# Patient Record
Sex: FEMALE | Race: BLACK OR AFRICAN AMERICAN | NOT HISPANIC OR LATINO | Employment: FULL TIME | URBAN - METROPOLITAN AREA
[De-identification: names, ages, dates, MRNs, and addresses within clinical notes are randomized per-mention and may not be internally consistent; named-entity substitution may affect disease eponyms.]

---

## 2020-06-03 ENCOUNTER — OFFICE VISIT (OUTPATIENT)
Dept: FAMILY MEDICINE | Facility: CLINIC | Age: 35
End: 2020-06-03
Payer: MEDICAID

## 2020-06-03 VITALS
HEART RATE: 90 BPM | RESPIRATION RATE: 16 BRPM | BODY MASS INDEX: 43.4 KG/M2 | HEIGHT: 69 IN | DIASTOLIC BLOOD PRESSURE: 100 MMHG | TEMPERATURE: 97 F | OXYGEN SATURATION: 98 % | WEIGHT: 293 LBS | SYSTOLIC BLOOD PRESSURE: 140 MMHG

## 2020-06-03 DIAGNOSIS — F41.9 ANXIETY: Chronic | ICD-10-CM

## 2020-06-03 DIAGNOSIS — Z76.89 ENCOUNTER TO ESTABLISH CARE: ICD-10-CM

## 2020-06-03 DIAGNOSIS — J45.20 MILD INTERMITTENT ASTHMA, UNSPECIFIED WHETHER COMPLICATED: Chronic | ICD-10-CM

## 2020-06-03 DIAGNOSIS — E66.01 MORBID OBESITY WITH BMI OF 45.0-49.9, ADULT: Chronic | ICD-10-CM

## 2020-06-03 DIAGNOSIS — Z79.899 ON LONG TERM DRUG THERAPY: ICD-10-CM

## 2020-06-03 DIAGNOSIS — F17.200 SMOKER: Chronic | ICD-10-CM

## 2020-06-03 DIAGNOSIS — Z00.00 WELLNESS EXAMINATION: Primary | ICD-10-CM

## 2020-06-03 DIAGNOSIS — N92.1 MENORRHAGIA WITH IRREGULAR CYCLE: ICD-10-CM

## 2020-06-03 DIAGNOSIS — L30.9 ECZEMA, UNSPECIFIED TYPE: Chronic | ICD-10-CM

## 2020-06-03 LAB
ABS NRBC COUNT: 0 X 10 3/UL (ref 0–0.01)
ABSOLUTE BASOPHIL: 0.04 X 10 3/UL (ref 0–0.22)
ABSOLUTE EOSINOPHIL: 0.18 X 10 3/UL (ref 0.04–0.54)
ABSOLUTE IMMATURE GRAN: 0.02 X 10 3/UL (ref 0–0.04)
ABSOLUTE LYMPHOCYTE: 2.68 X 10 3/UL (ref 0.86–4.75)
ABSOLUTE MONOCYTE: 0.59 X 10 3/UL (ref 0.22–1.08)
ALBUMIN SERPL-MCNC: 3.9 G/DL (ref 3.5–5.2)
ALBUMIN/GLOB SERPL ELPH: 1 {RATIO} (ref 1–2.7)
ALP ISOS SERPL LEV INH-CCNC: 61 U/L (ref 35–105)
ALT (SGPT): 20 U/L (ref 0–33)
ANION GAP SERPL CALC-SCNC: 12 MMOL/L (ref 8–17)
AST SERPL-CCNC: 16 U/L (ref 0–32)
B12: 391 PG/ML (ref 232–1245)
BASOPHILS NFR BLD: 0.5 % (ref 0.2–1.2)
BILIRUBIN, TOTAL: 0.24 MG/DL (ref 0–1.2)
BUN/CREAT SERPL: 14.5 (ref 6–20)
CALCIUM SERPL-MCNC: 9.2 MG/DL (ref 8.6–10.2)
CARBON DIOXIDE, CO2: 27 MMOL/L (ref 22–29)
CHLORIDE: 103 MMOL/L (ref 98–107)
CHOLEST SERPL-MSCNC: 208 MG/DL (ref 100–200)
CREAT SERPL-MCNC: 0.82 MG/DL (ref 0.5–0.9)
EOSINOPHIL NFR BLD: 2.1 % (ref 0.7–7)
ESTIMATED AVERAGE GLUCOSE: 140 MG/DL
FSH: 6.5 MIU/ML
GFR ESTIMATION: 79.33
GLOBULIN: 3.8 G/DL (ref 1.5–4.5)
GLUCOSE: 83 MG/DL (ref 74–106)
HBA1C MFR BLD: 6.5 % (ref 4–6)
HCT VFR BLD AUTO: 39.5 % (ref 37–47)
HDLC SERPL-MCNC: 45 MG/DL
HGB BLD-MCNC: 12.4 G/DL (ref 12–16)
IMMATURE GRANULOCYTES: 0.2 % (ref 0–0.5)
LDL/HDL RATIO: 3.1 (ref 1–3)
LDLC SERPL CALC-MCNC: 140 MG/DL (ref 0–100)
LH: 6.69 MIU/ML
LYMPHOCYTES NFR BLD: 30.8 % (ref 19.3–53.1)
MCH RBC QN AUTO: 28.1 PG (ref 27–32)
MCHC RBC AUTO-ENTMCNC: 31.4 G/DL (ref 32–36)
MCV RBC AUTO: 89.6 FL (ref 82–100)
MONOCYTES NFR BLD: 6.8 % (ref 4.7–12.5)
NEUTROPHILS ABSOLUTE COUNT: 5.2 X 10 3/UL (ref 2.15–7.56)
NEUTROPHILS NFR BLD: 59.6 %
NUCLEATED RED BLOOD CELLS: 0 /100 WBC (ref 0–0.2)
PLATELET # BLD AUTO: 401 X 10 3/UL (ref 135–400)
POTASSIUM: 3.9 MMOL/L (ref 3.5–5.1)
PROGEST SERPL-MCNC: <0.2 NG/ML
PROT SNV-MCNC: 7.7 G/DL (ref 6.4–8.3)
RBC # BLD AUTO: 4.41 X 10 6/UL (ref 4.2–5.4)
RDW-SD: 45.6 FL (ref 37–54)
SODIUM: 142 MMOL/L (ref 136–145)
TRIGL SERPL-MCNC: 115 MG/DL (ref 0–150)
TSH SERPL DL<=0.005 MIU/L-ACNC: 1.27 UIU/ML (ref 0.27–4.2)
UREA NITROGEN (BUN): 11.9 MG/DL (ref 6–20)
VITAMIN D (25OHD): 15.2 NG/ML
WBC # BLD: 8.71 X 10 3/UL (ref 4.3–10.8)

## 2020-06-03 PROCEDURE — 99385 PREV VISIT NEW AGE 18-39: CPT | Mod: S$GLB,,, | Performed by: FAMILY MEDICINE

## 2020-06-03 PROCEDURE — 99385 PR PREVENTIVE VISIT,NEW,18-39: ICD-10-PCS | Mod: S$GLB,,, | Performed by: FAMILY MEDICINE

## 2020-06-03 RX ORDER — HYDROXYZINE HYDROCHLORIDE 50 MG/1
50 TABLET, FILM COATED ORAL NIGHTLY PRN
Qty: 90 TABLET | Refills: 1 | Status: SHIPPED | OUTPATIENT
Start: 2020-06-03

## 2020-06-03 RX ORDER — IPRATROPIUM BROMIDE AND ALBUTEROL SULFATE 2.5; .5 MG/3ML; MG/3ML
3 SOLUTION RESPIRATORY (INHALATION) EVERY 6 HOURS PRN
Qty: 1 BOX | Refills: 0 | Status: SHIPPED | OUTPATIENT
Start: 2020-06-03 | End: 2021-06-02 | Stop reason: SDUPTHER

## 2020-06-03 RX ORDER — MONTELUKAST SODIUM 10 MG/1
10 TABLET ORAL NIGHTLY
Qty: 30 TABLET | Refills: 0 | Status: SHIPPED | OUTPATIENT
Start: 2020-06-03 | End: 2020-07-03

## 2020-06-03 RX ORDER — ALBUTEROL SULFATE 90 UG/1
2 AEROSOL, METERED RESPIRATORY (INHALATION) EVERY 6 HOURS PRN
Qty: 18 G | Refills: 5 | Status: SHIPPED | OUTPATIENT
Start: 2020-06-03

## 2020-06-03 RX ORDER — ALBUTEROL SULFATE 1.25 MG/3ML
1.25 SOLUTION RESPIRATORY (INHALATION) EVERY 6 HOURS PRN
Qty: 1 BOX | Refills: 0 | Status: SHIPPED | OUTPATIENT
Start: 2020-06-03 | End: 2021-06-02 | Stop reason: SDUPTHER

## 2020-06-03 RX ORDER — BUPROPION HYDROCHLORIDE 150 MG/1
150 TABLET, EXTENDED RELEASE ORAL 2 TIMES DAILY
Qty: 60 TABLET | Refills: 11 | Status: SHIPPED | OUTPATIENT
Start: 2020-06-03

## 2020-06-03 RX ORDER — TRIAMCINOLONE ACETONIDE 5 MG/G
CREAM TOPICAL 2 TIMES DAILY
Qty: 15 G | Refills: 1 | Status: SHIPPED | OUTPATIENT
Start: 2020-06-03

## 2020-06-03 NOTE — PROGRESS NOTES
Subjective:       Patient ID: Chikis Hargrove is a 34 y.o. female.    Chief Complaint: Establish Care (pt is here to get established. pt wants something to quit smoking.); Shortness of Breath (pt states she is having trouble breathing for about 2 weeks); and Eczema (pt states it comes and goes on her chin and back and around her nipple. pt is wanting a cream for it. )    33 yo F here to get established and wellness exam. Not seen by a PCP for a while. No meds.   Pt is a smoker and she'd like to have something so it will make it easier to not smoke.   pt is here to get established. pt wants something to quit smoking.   Obesity: pt would like to lose weight. We discussed several possibilities and we will see what the labs show before we come up with a plan  Pt also was dx'd with asthma and she has SoB now. She went to [place] for a while and she did not have SoB, now she has asthma like symptoms again. Discussed allergy induced asthma.  Eczema: pt has some dry and hyperpigmented skin on her chin which comes and goes. It is correlated with the seasons and is only there during the late spring time. She has gotten triamcinolone in the past and would like a refill.             Review of Systems   Constitutional: Negative for activity change, chills, fatigue, fever and unexpected weight change.   HENT: Negative for ear pain, rhinorrhea and trouble swallowing.    Eyes: Negative for pain.   Respiratory: Negative for cough, chest tightness, shortness of breath and wheezing.    Cardiovascular: Negative for chest pain and palpitations.   Gastrointestinal: Negative for abdominal distention, abdominal pain, constipation, diarrhea, nausea and vomiting.   Endocrine: Negative for cold intolerance and heat intolerance.   Genitourinary: Negative for dysuria, frequency and urgency.   Musculoskeletal: Negative for myalgias.   Skin: Negative for rash.   Neurological: Negative for dizziness, syncope, light-headedness and headaches.    Hematological: Does not bruise/bleed easily.   Psychiatric/Behavioral: Negative for agitation and confusion.       Objective:      Physical Exam   Constitutional: She appears well-developed.   HENT:   Right Ear: External ear normal.   Left Ear: External ear normal.   Mouth/Throat: Oropharynx is clear and moist.   Eyes: Conjunctivae and EOM are normal.   Neck: Normal range of motion.   Cardiovascular: Normal rate, regular rhythm and intact distal pulses.   Pulmonary/Chest: Effort normal and breath sounds normal.   Abdominal: Soft.   Musculoskeletal: Normal range of motion.   Neurological: She is alert.   Skin: Skin is warm. Capillary refill takes less than 2 seconds.   Psychiatric: She has a normal mood and affect.   Nursing note and vitals reviewed.      Assessment:       1. Wellness examination    2. Encounter to establish care    3. Smoker    4. Morbid obesity with BMI of 45.0-49.9, adult    5. Mild intermittent asthma, unspecified whether complicated Adjusting regimen per notes   6. Eczema, unspecified type on chin Active   7. Anxiety    8. On long term drug therapy    9. Menorrhagia with irregular cycle        Plan:       PROBLEM LIST     Chikis was seen today for establish care, shortness of breath and eczema.    Diagnoses and all orders for this visit:    Wellness examination  -     CBC auto differential; Future  -     Comprehensive metabolic panel; Future  -     Lipid Panel; Future  -     Vitamin D; Future  -     Hemoglobin A1C; Future  -     TSH; Future  -     Vitamin B12; Future  -     CBC auto differential  -     Comprehensive metabolic panel  -     Lipid Panel  -     Vitamin D  -     Hemoglobin A1C  -     TSH  -     Vitamin B12    Encounter to establish care    Smoker  Comments:  wans to quit  Orders:  -     buPROPion (WELLBUTRIN SR) 150 MG TBSR 12 hr tablet; Take 1 tablet (150 mg total) by mouth 2 (two) times daily. Take qD x 3 days then BiD    Morbid obesity with BMI of 45.0-49.9, adult  Comments:  pt  wants to lose weight    Mild intermittent asthma, unspecified whether complicated  -     montelukast (SINGULAIR) 10 mg tablet; Take 1 tablet (10 mg total) by mouth every evening.  -     NEBULIZER KIT (SUPPLIES) FOR HOME USE  -     albuterol-ipratropium (DUO-NEB) 2.5 mg-0.5 mg/3 mL nebulizer solution; Take 3 mLs by nebulization every 6 (six) hours as needed for Wheezing. Rescue  -     albuterol (ACCUNEB) 1.25 mg/3 mL Nebu; Take 3 mLs (1.25 mg total) by nebulization every 6 (six) hours as needed. Rescue  -     albuterol (VENTOLIN HFA) 90 mcg/actuation inhaler; Inhale 2 puffs into the lungs every 6 (six) hours as needed for Wheezing or Shortness of Breath. Rescue    Eczema, unspecified type on chin  Comments:  triamcinalone creme  Orders:  -     triamcinolone acetonide 0.5% (KENALOG) 0.5 % Crea; Apply topically 2 (two) times daily.    Anxiety  Comments:  wellbutrin, hydroxizine  Orders:  -     buPROPion (WELLBUTRIN SR) 150 MG TBSR 12 hr tablet; Take 1 tablet (150 mg total) by mouth 2 (two) times daily. Take qD x 3 days then BiD  -     hydrOXYzine (ATARAX) 50 MG tablet; Take 1 tablet (50 mg total) by mouth nightly as needed for Anxiety.    On long term drug therapy  -     CBC auto differential; Future  -     Comprehensive metabolic panel; Future  -     Lipid Panel; Future  -     Vitamin D; Future  -     Hemoglobin A1C; Future  -     TSH; Future  -     Vitamin B12; Future  -     CBC auto differential  -     Comprehensive metabolic panel  -     Lipid Panel  -     Vitamin D  -     Hemoglobin A1C  -     TSH  -     Vitamin B12    Menorrhagia with irregular cycle  -     Luteinizing hormone; Future  -     Follicle stimulating hormone; Future  -     Progesterone; Future  -     Estrogens, total; Future  -     Luteinizing hormone  -     Follicle stimulating hormone  -     Progesterone  -     Estrogens, total

## 2020-06-05 ENCOUNTER — TELEPHONE (OUTPATIENT)
Dept: FAMILY MEDICINE | Facility: CLINIC | Age: 35
End: 2020-06-05

## 2020-06-05 NOTE — TELEPHONE ENCOUNTER
----- Message from Charo Hitchcock MD sent at 6/4/2020  4:41 PM CDT -----  Please call the patient and ask if she is coming back to discuss the labs? Her hormones actually look normal although not everything is back and she is technically diabetic and her cholesterol is high and her vitamin d is low - so there are a few items that I would like to discuss with her if she can make it back in.

## 2020-06-06 LAB — ESTROGEN SERPL-MCNC: 251.1 PG/ML

## 2020-06-21 NOTE — PROGRESS NOTES
Subjective:       Patient ID: Chikis Hargrove is a 34 y.o. female.    Chief Complaint: Follow-up (pt is here for a f/u and to go over lab work results. )    35 yo F here to discuss lab results.   Pt is diabetic w/ A1C = 6.5% - this is a new dx for me and pt.   Pt has HLD w/ ascvd of   - this is a new dx for me and pt.  Pt is obese and we can possibly assume that she has Metabolic Syndrome x with her weight proportions and DM and w/ HDL less than 50 (but normal TGA)  BP is well controlled today and HR has normalized as well. Pt has reduced her smoking significantly. yay     Review of Systems   Constitutional: Negative for activity change, chills, fatigue, fever and unexpected weight change.   HENT: Negative for ear pain, rhinorrhea and trouble swallowing.    Eyes: Negative for pain.   Respiratory: Negative for cough, chest tightness, shortness of breath and wheezing.    Cardiovascular: Negative for chest pain and palpitations.   Gastrointestinal: Negative for abdominal distention, abdominal pain, constipation, diarrhea, nausea and vomiting.   Endocrine: Negative for cold intolerance and heat intolerance.   Genitourinary: Negative for dysuria, frequency and urgency.   Musculoskeletal: Negative for myalgias.   Skin: Negative for rash.   Neurological: Negative for dizziness, syncope, light-headedness and headaches.   Hematological: Does not bruise/bleed easily.   Psychiatric/Behavioral: Negative for agitation and confusion.       Objective:      Physical Exam  Vitals signs and nursing note reviewed.   Constitutional:       Appearance: Normal appearance. She is well-developed.   HENT:      Head: Normocephalic.      Right Ear: External ear normal.      Left Ear: External ear normal.   Eyes:      Conjunctiva/sclera: Conjunctivae normal.   Neck:      Musculoskeletal: Normal range of motion.   Cardiovascular:      Rate and Rhythm: Normal rate and regular rhythm.      Pulses: Normal pulses.   Pulmonary:      Effort:  Pulmonary effort is normal.      Breath sounds: Normal breath sounds.   Abdominal:      Palpations: Abdomen is soft.   Musculoskeletal: Normal range of motion.         General: No deformity.   Skin:     General: Skin is warm.      Capillary Refill: Capillary refill takes less than 2 seconds.   Neurological:      Mental Status: She is alert.         Assessment:       1. Encounter to discuss test results    2. Metabolic syndrome X    3. Smoker    4. Morbid obesity with BMI of 45.0-49.9, adult    5. Mild intermittent asthma without complication    6. Pure hypercholesterolemia    7. Type 2 diabetes mellitus without complication, without long-term current use of insulin        Plan:       PROBLEM LIST     Chikis was seen today for follow-up.    Diagnoses and all orders for this visit:    Encounter to discuss test results  Comments:  HLD, DM, vit d def    Metabolic syndrome X    Smoker  Comments:  improving    Morbid obesity with BMI of 45.0-49.9, adult  Comments:  pt lost some weight already    Mild intermittent asthma without complication    Pure hypercholesterolemia  Comments:  new dx; ascvd = 5% - we will wait and see if it drops by itself    Type 2 diabetes mellitus without complication, without long-term current use of insulin  Comments:  new dx; a1c=6.5%  Orders:  -     metFORMIN (GLUCOPHAGE) 500 MG tablet; Take 1 tablet (500 mg total) by mouth daily with breakfast.

## 2020-06-22 ENCOUNTER — OFFICE VISIT (OUTPATIENT)
Dept: FAMILY MEDICINE | Facility: CLINIC | Age: 35
End: 2020-06-22
Payer: MEDICAID

## 2020-06-22 VITALS
TEMPERATURE: 97 F | WEIGHT: 293 LBS | DIASTOLIC BLOOD PRESSURE: 84 MMHG | HEIGHT: 69 IN | SYSTOLIC BLOOD PRESSURE: 131 MMHG | OXYGEN SATURATION: 97 % | HEART RATE: 76 BPM | RESPIRATION RATE: 16 BRPM | BODY MASS INDEX: 43.4 KG/M2

## 2020-06-22 DIAGNOSIS — E66.01 MORBID OBESITY WITH BMI OF 45.0-49.9, ADULT: Chronic | ICD-10-CM

## 2020-06-22 DIAGNOSIS — E88.810 METABOLIC SYNDROME X: Chronic | ICD-10-CM

## 2020-06-22 DIAGNOSIS — Z71.2 ENCOUNTER TO DISCUSS TEST RESULTS: Primary | ICD-10-CM

## 2020-06-22 DIAGNOSIS — E78.00 PURE HYPERCHOLESTEROLEMIA: Chronic | ICD-10-CM

## 2020-06-22 DIAGNOSIS — E11.9 TYPE 2 DIABETES MELLITUS WITHOUT COMPLICATION, WITHOUT LONG-TERM CURRENT USE OF INSULIN: Chronic | ICD-10-CM

## 2020-06-22 DIAGNOSIS — J45.20 MILD INTERMITTENT ASTHMA WITHOUT COMPLICATION: ICD-10-CM

## 2020-06-22 DIAGNOSIS — F17.200 SMOKER: Chronic | ICD-10-CM

## 2020-06-22 PROCEDURE — 99214 PR OFFICE/OUTPT VISIT, EST, LEVL IV, 30-39 MIN: ICD-10-PCS | Mod: S$GLB,,, | Performed by: FAMILY MEDICINE

## 2020-06-22 PROCEDURE — 99214 OFFICE O/P EST MOD 30 MIN: CPT | Mod: S$GLB,,, | Performed by: FAMILY MEDICINE

## 2020-06-22 RX ORDER — METFORMIN HYDROCHLORIDE 500 MG/1
500 TABLET ORAL
Qty: 90 TABLET | Refills: 1 | Status: SHIPPED | OUTPATIENT
Start: 2020-06-22 | End: 2021-02-23 | Stop reason: SDUPTHER

## 2020-09-13 DIAGNOSIS — N39.0 URINARY TRACT INFECTION WITHOUT HEMATURIA, SITE UNSPECIFIED: Primary | ICD-10-CM

## 2020-09-13 RX ORDER — NITROFURANTOIN 25; 75 MG/1; MG/1
100 CAPSULE ORAL 2 TIMES DAILY
Qty: 10 CAPSULE | Refills: 0 | Status: SHIPPED | OUTPATIENT
Start: 2020-09-13 | End: 2021-03-15

## 2021-02-23 DIAGNOSIS — E11.9 TYPE 2 DIABETES MELLITUS WITHOUT COMPLICATION, WITHOUT LONG-TERM CURRENT USE OF INSULIN: Chronic | ICD-10-CM

## 2021-02-23 RX ORDER — METFORMIN HYDROCHLORIDE 500 MG/1
500 TABLET ORAL
Qty: 90 TABLET | Refills: 1 | Status: SHIPPED | OUTPATIENT
Start: 2021-02-23 | End: 2021-08-23

## 2021-03-15 ENCOUNTER — OFFICE VISIT (OUTPATIENT)
Dept: FAMILY MEDICINE | Facility: CLINIC | Age: 36
End: 2021-03-15
Payer: MEDICAID

## 2021-03-15 VITALS
DIASTOLIC BLOOD PRESSURE: 90 MMHG | OXYGEN SATURATION: 97 % | RESPIRATION RATE: 16 BRPM | SYSTOLIC BLOOD PRESSURE: 137 MMHG | HEIGHT: 69 IN | BODY MASS INDEX: 43.4 KG/M2 | HEART RATE: 87 BPM | TEMPERATURE: 98 F | WEIGHT: 293 LBS

## 2021-03-15 DIAGNOSIS — E66.01 CLASS 3 SEVERE OBESITY WITH BODY MASS INDEX (BMI) OF 60.0 TO 69.9 IN ADULT, UNSPECIFIED OBESITY TYPE, UNSPECIFIED WHETHER SERIOUS COMORBIDITY PRESENT: ICD-10-CM

## 2021-03-15 DIAGNOSIS — R73.03 PREDIABETES: ICD-10-CM

## 2021-03-15 DIAGNOSIS — R53.83 FATIGUE, UNSPECIFIED TYPE: ICD-10-CM

## 2021-03-15 DIAGNOSIS — J45.909 ASTHMA, UNSPECIFIED ASTHMA SEVERITY, UNSPECIFIED WHETHER COMPLICATED, UNSPECIFIED WHETHER PERSISTENT: Primary | ICD-10-CM

## 2021-03-15 DIAGNOSIS — E55.9 VITAMIN D DEFICIENCY: ICD-10-CM

## 2021-03-15 DIAGNOSIS — E78.5 HYPERLIPIDEMIA, UNSPECIFIED HYPERLIPIDEMIA TYPE: ICD-10-CM

## 2021-03-15 PROCEDURE — 99214 OFFICE O/P EST MOD 30 MIN: CPT | Mod: S$GLB,,, | Performed by: INTERNAL MEDICINE

## 2021-03-15 PROCEDURE — 99214 PR OFFICE/OUTPT VISIT, EST, LEVL IV, 30-39 MIN: ICD-10-PCS | Mod: S$GLB,,, | Performed by: INTERNAL MEDICINE

## 2021-04-26 ENCOUNTER — PATIENT OUTREACH (OUTPATIENT)
Dept: ADMINISTRATIVE | Facility: HOSPITAL | Age: 36
End: 2021-04-26

## 2021-06-02 DIAGNOSIS — J45.20 MILD INTERMITTENT ASTHMA, UNSPECIFIED WHETHER COMPLICATED: Chronic | ICD-10-CM

## 2021-06-02 RX ORDER — MONTELUKAST SODIUM 10 MG/1
10 TABLET ORAL NIGHTLY
Qty: 90 TABLET | Refills: 3 | Status: SHIPPED | OUTPATIENT
Start: 2021-06-02 | End: 2021-07-02

## 2021-06-02 RX ORDER — IPRATROPIUM BROMIDE AND ALBUTEROL SULFATE 2.5; .5 MG/3ML; MG/3ML
3 SOLUTION RESPIRATORY (INHALATION) EVERY 6 HOURS PRN
Qty: 1 BOX | Refills: 3 | Status: SHIPPED | OUTPATIENT
Start: 2021-06-02 | End: 2022-06-02

## 2021-06-02 RX ORDER — ALBUTEROL SULFATE 1.25 MG/3ML
1.25 SOLUTION RESPIRATORY (INHALATION) EVERY 6 HOURS PRN
Qty: 0.1 ML | Refills: 0 | Status: SHIPPED | OUTPATIENT
Start: 2021-06-02 | End: 2022-06-02

## 2021-11-15 DIAGNOSIS — E11.9 TYPE 2 DIABETES MELLITUS WITHOUT COMPLICATION, WITHOUT LONG-TERM CURRENT USE OF INSULIN: Chronic | ICD-10-CM

## 2021-11-15 RX ORDER — METFORMIN HYDROCHLORIDE 500 MG/1
500 TABLET ORAL DAILY
Qty: 90 TABLET | Refills: 3 | Status: SHIPPED | OUTPATIENT
Start: 2021-11-15

## 2023-12-26 ENCOUNTER — OFFICE VISIT (OUTPATIENT)
Dept: URGENT CARE | Facility: CLINIC | Age: 38
End: 2023-12-26
Payer: MEDICAID

## 2023-12-26 VITALS
WEIGHT: 293 LBS | TEMPERATURE: 99 F | HEART RATE: 101 BPM | DIASTOLIC BLOOD PRESSURE: 102 MMHG | RESPIRATION RATE: 18 BRPM | BODY MASS INDEX: 43.4 KG/M2 | SYSTOLIC BLOOD PRESSURE: 161 MMHG | HEIGHT: 69 IN | OXYGEN SATURATION: 97 %

## 2023-12-26 DIAGNOSIS — J01.90 ACUTE BACTERIAL SINUSITIS: ICD-10-CM

## 2023-12-26 DIAGNOSIS — R06.02 SOB (SHORTNESS OF BREATH): ICD-10-CM

## 2023-12-26 DIAGNOSIS — I10 PRIMARY HYPERTENSION: ICD-10-CM

## 2023-12-26 DIAGNOSIS — J45.21 MILD INTERMITTENT ASTHMA WITH ACUTE EXACERBATION: Primary | ICD-10-CM

## 2023-12-26 DIAGNOSIS — B96.89 ACUTE BACTERIAL SINUSITIS: ICD-10-CM

## 2023-12-26 DIAGNOSIS — R05.9 COUGH, UNSPECIFIED TYPE: ICD-10-CM

## 2023-12-26 LAB
CTP QC/QA: YES
POC MOLECULAR INFLUENZA A AGN: NEGATIVE
POC MOLECULAR INFLUENZA B AGN: NEGATIVE

## 2023-12-26 PROCEDURE — 87502 INFLUENZA DNA AMP PROBE: CPT | Mod: QW,,, | Performed by: STUDENT IN AN ORGANIZED HEALTH CARE EDUCATION/TRAINING PROGRAM

## 2023-12-26 PROCEDURE — 87502 POCT INFLUENZA A/B MOLECULAR: ICD-10-PCS | Mod: QW,,, | Performed by: STUDENT IN AN ORGANIZED HEALTH CARE EDUCATION/TRAINING PROGRAM

## 2023-12-26 PROCEDURE — 99214 PR OFFICE/OUTPT VISIT, EST, LEVL IV, 30-39 MIN: ICD-10-PCS | Mod: 25,S$GLB,, | Performed by: STUDENT IN AN ORGANIZED HEALTH CARE EDUCATION/TRAINING PROGRAM

## 2023-12-26 PROCEDURE — 94640 AIRWAY INHALATION TREATMENT: CPT | Mod: S$GLB,,, | Performed by: STUDENT IN AN ORGANIZED HEALTH CARE EDUCATION/TRAINING PROGRAM

## 2023-12-26 PROCEDURE — 99214 OFFICE O/P EST MOD 30 MIN: CPT | Mod: 25,S$GLB,, | Performed by: STUDENT IN AN ORGANIZED HEALTH CARE EDUCATION/TRAINING PROGRAM

## 2023-12-26 PROCEDURE — 94640 PR INHAL RX, AIRWAY OBST/DX SPUTUM INDUCT: ICD-10-PCS | Mod: S$GLB,,, | Performed by: STUDENT IN AN ORGANIZED HEALTH CARE EDUCATION/TRAINING PROGRAM

## 2023-12-26 RX ORDER — PREDNISONE 20 MG/1
40 TABLET ORAL DAILY
Qty: 10 TABLET | Refills: 0 | Status: SHIPPED | OUTPATIENT
Start: 2023-12-26 | End: 2023-12-31

## 2023-12-26 RX ORDER — AMLODIPINE BESYLATE 10 MG/1
10 TABLET ORAL DAILY
Qty: 30 TABLET | Refills: 0 | Status: SHIPPED | OUTPATIENT
Start: 2023-12-26 | End: 2024-01-25

## 2023-12-26 RX ORDER — ALBUTEROL SULFATE 0.83 MG/ML
2.5 SOLUTION RESPIRATORY (INHALATION)
Status: COMPLETED | OUTPATIENT
Start: 2023-12-26 | End: 2023-12-26

## 2023-12-26 RX ORDER — AMOXICILLIN AND CLAVULANATE POTASSIUM 875; 125 MG/1; MG/1
1 TABLET, FILM COATED ORAL EVERY 12 HOURS
Qty: 10 TABLET | Refills: 0 | Status: SHIPPED | OUTPATIENT
Start: 2023-12-26 | End: 2023-12-31

## 2023-12-26 RX ORDER — IPRATROPIUM BROMIDE 0.5 MG/2.5ML
0.5 SOLUTION RESPIRATORY (INHALATION)
Status: COMPLETED | OUTPATIENT
Start: 2023-12-26 | End: 2023-12-26

## 2023-12-26 RX ORDER — BENZONATATE 100 MG/1
100 CAPSULE ORAL 3 TIMES DAILY PRN
Qty: 30 CAPSULE | Refills: 0 | Status: SHIPPED | OUTPATIENT
Start: 2023-12-26 | End: 2024-01-05

## 2023-12-26 RX ADMIN — IPRATROPIUM BROMIDE 0.5 MG: 0.5 SOLUTION RESPIRATORY (INHALATION) at 02:12

## 2023-12-26 RX ADMIN — ALBUTEROL SULFATE 2.5 MG: 0.83 SOLUTION RESPIRATORY (INHALATION) at 02:12

## 2023-12-26 NOTE — PATIENT INSTRUCTIONS
Please follow up with your primary care provider within 2-5 days if your signs and symptoms have not resolved or worsen.     If your condition worsens or fails to improve we recommend that you receive another evaluation at the emergency room immediately or contact your primary medical clinic to discuss your concerns.   You must understand that you have received an Urgent Care treatment only and that you may be released before all of your medical problems are known or treated. You, the patient, will arrange for follow up care as instructed.        Please be sure to follow up with your PCP to be started on some blood pressure medication. Please complete course of antibiotics and steroids.  You can take the cough medication as needed.  If you continue to have any chest pain or shortness a breath then please go to the emergency room.  Please go to [facility] ER or [facility] ER to get your chest x-ray done.  I will call you with the results. Please be sure to use albuterol inhaler scheduled every 6 hours for the next 24-48 hours. If you notice that your asthma does not getter better then please follow up with your primary care to get a different kind of inhaler.

## 2023-12-26 NOTE — PROGRESS NOTES
"Subjective:      Patient ID: Chikis Hargrove is a 38 y.o. female.    Vitals:  height is 5' 9" (1.753 m) and weight is 152 kg (335 lb) (abnormal). Her oral temperature is 99.3 °F (37.4 °C). Her blood pressure is 161/102 (abnormal) and her pulse is 101. Her respiration is 18 and oxygen saturation is 97%.     Chief Complaint: Nasal Congestion, Chest Congestion, and Cough    Pt is here today because she is experiencing chest congestion, nasal congestion, and coughing. Pt states her symptoms started yesterday. Her son tested positive for Flu B last Thursday. Pt has been taking OTC mucinex. Coughing up green mucous. Low grade fever today. Uses her albuterol inhaler three times a day and normally she never uses it.     PCP is Kim Aguero with [facility]. Will see her PCP in 1/8/24. Her BP is normally not that high but she is going to talk to her doctor about it.       Cough  This is a new problem. The current episode started yesterday. The problem has been unchanged. Associated symptoms include chest pain, a fever (low grade fever) and shortness of breath. Pertinent negatives include no wheezing.       Constitution: Positive for fever (low grade fever).   Cardiovascular:  Positive for chest pain.   Respiratory:  Positive for cough, sputum production, shortness of breath and asthma. Negative for wheezing.    Gastrointestinal:  Negative for abdominal pain, nausea and vomiting.   Allergic/Immunologic: Positive for asthma.      Objective:     Physical Exam   HENT:   Head: Normocephalic and atraumatic.   Ears:   Right Ear: Tympanic membrane normal.   Left Ear: Tympanic membrane normal.   Nose: Right sinus exhibits maxillary sinus tenderness and frontal sinus tenderness. Left sinus exhibits maxillary sinus tenderness and frontal sinus tenderness.   Mouth/Throat: Uvula is midline, oropharynx is clear and moist and mucous membranes are normal. Mucous membranes are moist. No tonsillar exudate. Oropharynx is clear.   Eyes: " Conjunctivae are normal. Pupils are equal, round, and reactive to light.   Cardiovascular: Normal rate, regular rhythm and normal heart sounds.   Pulmonary/Chest: Effort normal. No respiratory distress. She has no wheezes. She has no rhonchi. She has no rales.   Dry cough  Coarse breath sounds were heard in the bilateral lower lung bases         Comments: Dry cough  Coarse breath sounds were heard in the bilateral lower lung bases    Abdominal: Normal appearance.   Neurological: She is alert.   Psychiatric: Her behavior is normal. Mood normal.     Results for orders placed or performed in visit on 12/26/23   POCT Influenza A/B MOLECULAR   Result Value Ref Range    POC Molecular Influenza A Ag Negative Negative, Not Reported    POC Molecular Influenza B Ag Negative Negative, Not Reported     Acceptable Yes       Assessment:     1. Mild intermittent asthma with acute exacerbation    2. Primary hypertension    3. Acute bacterial sinusitis    4. Cough, unspecified type    5. SOB (shortness of breath)        Plan:       Mild intermittent asthma with acute exacerbation  -     Cancel: XR CHEST PA AND LATERAL; Future; Expected date: 12/26/2023  -     predniSONE (DELTASONE) 20 MG tablet; Take 2 tablets (40 mg total) by mouth once daily. for 5 days  Dispense: 10 tablet; Refill: 0  -     XR CHEST PA AND LATERAL; Future; Expected date: 12/26/2023    Primary hypertension  -     amLODIPine (NORVASC) 10 MG tablet; Take 1 tablet (10 mg total) by mouth once daily.  Dispense: 30 tablet; Refill: 0    Acute bacterial sinusitis  -     amoxicillin-clavulanate 875-125mg (AUGMENTIN) 875-125 mg per tablet; Take 1 tablet by mouth every 12 (twelve) hours. for 5 days  Dispense: 10 tablet; Refill: 0    Cough, unspecified type  -     POCT Influenza A/B MOLECULAR  -     Cancel: XR CHEST PA AND LATERAL; Future; Expected date: 12/26/2023  -     benzonatate (TESSALON) 100 MG capsule; Take 1 capsule (100 mg total) by mouth 3 (three)  times daily as needed for Cough.  Dispense: 30 capsule; Refill: 0  -     XR CHEST PA AND LATERAL; Future; Expected date: 12/26/2023    SOB (shortness of breath)  -     albuterol nebulizer solution 2.5 mg  -     ipratropium 0.02 % nebulizer solution 0.5 mg      Please follow up with your primary care provider within 2-5 days if your signs and symptoms have not resolved or worsen.     If your condition worsens or fails to improve we recommend that you receive another evaluation at the emergency room immediately or contact your primary medical clinic to discuss your concerns.   You must understand that you have received an Urgent Care treatment only and that you may be released before all of your medical problems are known or treated. You, the patient, will arrange for follow up care as instructed.        Please be sure to follow up with your PCP to be started on some blood pressure medication. Please complete course of antibiotics and steroids.  You can take the cough medication as needed.  If you continue to have any chest pain or shortness a breath then please go to the emergency room.  Please go to [facility] ER or [facility] ER to get your chest x-ray done.  I will call you with the results. Please be sure to use albuterol inhaler scheduled every 6 hours for the next 24-48 hours. If you notice that your asthma does not getter better then please follow up with your primary care to get a different kind of inhaler.     Medical Decision Making:   Differential Diagnosis:   Mild intermittent asthma with acute exacerbation and acute bacterial sinusitis  Uncontrolled high blood pressure  Clinical Tests:   Lab Tests: Ordered and Reviewed       <> Summary of Lab: Flu neg  Radiological Study: Ordered  Urgent Care Management:  Pt is here today because she is experiencing chest congestion, nasal congestion, and coughing. Pt states her symptoms started yesterday. Her son tested positive for Flu B last Thursday. Pt has been taking  OTC mucinex. Coughing up green mucous. Low grade fever today. Uses her albuterol inhaler three times a day and normally she never uses it.   PCP is Kim Aguero with [facility]. Will see her PCP in 1/8/24. Her BP is normally not that high but she is going to talk to her doctor about it.   HENT:   Head: Normocephalic and atraumatic.   Ears:   Right Ear: Tympanic membrane normal.   Left Ear: Tympanic membrane normal.   Nose: Right sinus exhibits maxillary sinus tenderness and frontal sinus tenderness. Left sinus exhibits maxillary sinus tenderness and frontal sinus tenderness.   Mouth/Throat: Uvula is midline, oropharynx is clear and moist and mucous membranes are normal. Mucous membranes are moist. No tonsillar exudate. Oropharynx is clear.   Eyes: Conjunctivae are normal. Pupils are equal, round, and reactive to light.   Cardiovascular: Normal rate, regular rhythm and normal heart sounds.   Pulmonary/Chest: Effort normal. No respiratory distress. She has no wheezes. She has no rhonchi. She has no rales.   Dry cough  Coarse breath sounds were heard in the bilateral lower lung bases         Comments: Dry cough  Coarse breath sounds were heard in the bilateral lower lung bases     Abdominal: Normal appearance.   Neurological: She is alert.   Psychiatric: Her behavior is normal. Mood normal.   Patient was given DuoNeb in office.  I sent the patient home with Augmentin twice a day for 5 days for sinusitis.  I gave the patient 40 mg of prednisone to take once daily.  She was also given amlodipine due to likely having a diagnosis of hypertension which is unmedicated.  The patient was also given Tessalon Perles as needed for cough.  ED and return precautions were given.  The patient vocalized understanding.  I sent the patient in order to get a chest x-ray done until her where to get it done I will call her with the results.